# Patient Record
Sex: MALE | Race: OTHER | URBAN - METROPOLITAN AREA
[De-identification: names, ages, dates, MRNs, and addresses within clinical notes are randomized per-mention and may not be internally consistent; named-entity substitution may affect disease eponyms.]

---

## 2020-01-31 ENCOUNTER — EMERGENCY (EMERGENCY)
Facility: HOSPITAL | Age: 85
LOS: 0 days | Discharge: NURSING, REHAB OR LONG-TERM CARE | End: 2020-02-01
Attending: EMERGENCY MEDICINE | Admitting: EMERGENCY MEDICINE
Payer: MEDICARE

## 2020-01-31 VITALS
RESPIRATION RATE: 18 BRPM | HEART RATE: 80 BPM | TEMPERATURE: 99 F | SYSTOLIC BLOOD PRESSURE: 122 MMHG | OXYGEN SATURATION: 95 % | DIASTOLIC BLOOD PRESSURE: 56 MMHG

## 2020-01-31 VITALS
RESPIRATION RATE: 16 BRPM | TEMPERATURE: 99 F | HEART RATE: 74 BPM | HEIGHT: 66 IN | WEIGHT: 182.1 LBS | DIASTOLIC BLOOD PRESSURE: 65 MMHG | SYSTOLIC BLOOD PRESSURE: 148 MMHG

## 2020-01-31 LAB
ALBUMIN SERPL ELPH-MCNC: 3.2 G/DL — LOW (ref 3.5–5.2)
ALP SERPL-CCNC: 106 U/L — SIGNIFICANT CHANGE UP (ref 30–115)
ALT FLD-CCNC: 18 U/L — SIGNIFICANT CHANGE UP (ref 0–41)
ANION GAP SERPL CALC-SCNC: 11 MMOL/L — SIGNIFICANT CHANGE UP (ref 7–14)
APPEARANCE UR: CLEAR — SIGNIFICANT CHANGE UP
APTT BLD: 31.6 SEC — SIGNIFICANT CHANGE UP (ref 27–39.2)
AST SERPL-CCNC: 27 U/L — SIGNIFICANT CHANGE UP (ref 0–41)
BASOPHILS # BLD AUTO: 0.05 K/UL — SIGNIFICANT CHANGE UP (ref 0–0.2)
BASOPHILS NFR BLD AUTO: 0.5 % — SIGNIFICANT CHANGE UP (ref 0–1)
BILIRUB SERPL-MCNC: 1.1 MG/DL — SIGNIFICANT CHANGE UP (ref 0.2–1.2)
BILIRUB UR-MCNC: NEGATIVE — SIGNIFICANT CHANGE UP
BLD GP AB SCN SERPL QL: SIGNIFICANT CHANGE UP
BUN SERPL-MCNC: 18 MG/DL — SIGNIFICANT CHANGE UP (ref 10–20)
CALCIUM SERPL-MCNC: 8.3 MG/DL — LOW (ref 8.5–10.1)
CHLORIDE SERPL-SCNC: 100 MMOL/L — SIGNIFICANT CHANGE UP (ref 98–110)
CO2 SERPL-SCNC: 25 MMOL/L — SIGNIFICANT CHANGE UP (ref 17–32)
COLOR SPEC: YELLOW — SIGNIFICANT CHANGE UP
CREAT SERPL-MCNC: 0.8 MG/DL — SIGNIFICANT CHANGE UP (ref 0.7–1.5)
DIFF PNL FLD: NEGATIVE — SIGNIFICANT CHANGE UP
EOSINOPHIL # BLD AUTO: 0.15 K/UL — SIGNIFICANT CHANGE UP (ref 0–0.7)
EOSINOPHIL NFR BLD AUTO: 1.6 % — SIGNIFICANT CHANGE UP (ref 0–8)
ETHANOL SERPL-MCNC: <10 MG/DL — SIGNIFICANT CHANGE UP
GLUCOSE SERPL-MCNC: 89 MG/DL — SIGNIFICANT CHANGE UP (ref 70–99)
GLUCOSE UR QL: NEGATIVE MG/DL — SIGNIFICANT CHANGE UP
HCT VFR BLD CALC: 24.1 % — LOW (ref 42–52)
HGB BLD-MCNC: 7.5 G/DL — LOW (ref 14–18)
IMM GRANULOCYTES NFR BLD AUTO: 0.9 % — HIGH (ref 0.1–0.3)
INR BLD: 1.11 RATIO — SIGNIFICANT CHANGE UP (ref 0.65–1.3)
KETONES UR-MCNC: NEGATIVE — SIGNIFICANT CHANGE UP
LACTATE SERPL-SCNC: 1.3 MMOL/L — SIGNIFICANT CHANGE UP (ref 0.7–2)
LEUKOCYTE ESTERASE UR-ACNC: NEGATIVE — SIGNIFICANT CHANGE UP
LIDOCAIN IGE QN: 12 U/L — SIGNIFICANT CHANGE UP (ref 7–60)
LYMPHOCYTES # BLD AUTO: 1.37 K/UL — SIGNIFICANT CHANGE UP (ref 1.2–3.4)
LYMPHOCYTES # BLD AUTO: 14.2 % — LOW (ref 20.5–51.1)
MCHC RBC-ENTMCNC: 28.1 PG — SIGNIFICANT CHANGE UP (ref 27–31)
MCHC RBC-ENTMCNC: 31.1 G/DL — LOW (ref 32–37)
MCV RBC AUTO: 90.3 FL — SIGNIFICANT CHANGE UP (ref 80–94)
MONOCYTES # BLD AUTO: 0.91 K/UL — HIGH (ref 0.1–0.6)
MONOCYTES NFR BLD AUTO: 9.4 % — HIGH (ref 1.7–9.3)
NEUTROPHILS # BLD AUTO: 7.07 K/UL — HIGH (ref 1.4–6.5)
NEUTROPHILS NFR BLD AUTO: 73.4 % — SIGNIFICANT CHANGE UP (ref 42.2–75.2)
NITRITE UR-MCNC: NEGATIVE — SIGNIFICANT CHANGE UP
NRBC # BLD: 0 /100 WBCS — SIGNIFICANT CHANGE UP (ref 0–0)
PH UR: 6 — SIGNIFICANT CHANGE UP (ref 5–8)
PLATELET # BLD AUTO: 442 K/UL — HIGH (ref 130–400)
POTASSIUM SERPL-MCNC: 4.6 MMOL/L — SIGNIFICANT CHANGE UP (ref 3.5–5)
POTASSIUM SERPL-SCNC: 4.6 MMOL/L — SIGNIFICANT CHANGE UP (ref 3.5–5)
PROT SERPL-MCNC: 5.3 G/DL — LOW (ref 6–8)
PROT UR-MCNC: NEGATIVE MG/DL — SIGNIFICANT CHANGE UP
PROTHROM AB SERPL-ACNC: 12.7 SEC — SIGNIFICANT CHANGE UP (ref 9.95–12.87)
RBC # BLD: 2.67 M/UL — LOW (ref 4.7–6.1)
RBC # FLD: 14.2 % — SIGNIFICANT CHANGE UP (ref 11.5–14.5)
SODIUM SERPL-SCNC: 136 MMOL/L — SIGNIFICANT CHANGE UP (ref 135–146)
SP GR SPEC: 1.01 — SIGNIFICANT CHANGE UP (ref 1.01–1.03)
UROBILINOGEN FLD QL: 2 MG/DL (ref 0.2–0.2)
WBC # BLD: 9.64 K/UL — SIGNIFICANT CHANGE UP (ref 4.8–10.8)
WBC # FLD AUTO: 9.64 K/UL — SIGNIFICANT CHANGE UP (ref 4.8–10.8)

## 2020-01-31 PROCEDURE — 73706 CT ANGIO LWR EXTR W/O&W/DYE: CPT | Mod: 26,50

## 2020-01-31 PROCEDURE — 99284 EMERGENCY DEPT VISIT MOD MDM: CPT | Mod: GC

## 2020-01-31 PROCEDURE — 72170 X-RAY EXAM OF PELVIS: CPT | Mod: 26

## 2020-01-31 PROCEDURE — 73552 X-RAY EXAM OF FEMUR 2/>: CPT | Mod: 26,RT

## 2020-01-31 PROCEDURE — 71045 X-RAY EXAM CHEST 1 VIEW: CPT | Mod: 26

## 2020-01-31 RX ORDER — ASPIRIN/CALCIUM CARB/MAGNESIUM 324 MG
1 TABLET ORAL
Qty: 0 | Refills: 0 | DISCHARGE

## 2020-01-31 RX ORDER — ATENOLOL 25 MG/1
1 TABLET ORAL
Qty: 0 | Refills: 0 | DISCHARGE

## 2020-01-31 RX ORDER — OXYCODONE HYDROCHLORIDE 5 MG/1
0 TABLET ORAL
Qty: 0 | Refills: 0 | DISCHARGE

## 2020-01-31 RX ORDER — PIOGLITAZONE HYDROCHLORIDE 15 MG/1
1 TABLET ORAL
Qty: 0 | Refills: 0 | DISCHARGE

## 2020-01-31 RX ORDER — PREGABALIN 225 MG/1
0 CAPSULE ORAL
Qty: 0 | Refills: 0 | DISCHARGE

## 2020-01-31 RX ORDER — FERROUS SULFATE 325(65) MG
0 TABLET ORAL
Qty: 0 | Refills: 0 | DISCHARGE

## 2020-01-31 RX ORDER — ACARBOSE 25 MG/1
1 TABLET ORAL
Qty: 0 | Refills: 0 | DISCHARGE

## 2020-01-31 RX ORDER — MAGNESIUM HYDROXIDE 400 MG/1
0 TABLET, CHEWABLE ORAL
Qty: 0 | Refills: 0 | DISCHARGE

## 2020-01-31 RX ORDER — ATORVASTATIN CALCIUM 80 MG/1
1 TABLET, FILM COATED ORAL
Qty: 0 | Refills: 0 | DISCHARGE

## 2020-01-31 RX ORDER — ENOXAPARIN SODIUM 100 MG/ML
0 INJECTION SUBCUTANEOUS
Qty: 0 | Refills: 0 | DISCHARGE

## 2020-01-31 RX ORDER — FINASTERIDE 5 MG/1
1 TABLET, FILM COATED ORAL
Qty: 0 | Refills: 0 | DISCHARGE

## 2020-01-31 RX ORDER — FUROSEMIDE 40 MG
1 TABLET ORAL
Qty: 0 | Refills: 0 | DISCHARGE

## 2020-01-31 NOTE — ED PROVIDER NOTE - PATIENT PORTAL LINK FT
You can access the FollowMyHealth Patient Portal offered by [facility] by registering at the following website: [email or web address]. By joining [employer]’s FollowMyHealth portal, you will also be able to view your health information using other applications (apps) compatible with our system.

## 2020-01-31 NOTE — ED PROVIDER NOTE - NS ED ROS FT
Constitutional:  See HPI.   Eyes:  No visual changes, eye pain or discharge.  ENMT:  No hearing changes, pain, discharge or infections. No neck pain or stiffness.  Cardiac:  No chest pain, SOB or edema. No chest pain with exertion.  Respiratory:  No cough or respiratory distress. No hemoptysis.  GI:  No nausea, vomiting, diarrhea, abdominal pain. + no GI bleeding  :  No dysuria, frequency, hematuria  MS:  + right sided leg pain, + right sided brusing, No joint pain or back pain.  Neuro:  No LOC. No headache or weakness.    Skin:  No skin rash.  Except as in HPI, all other review of systems is negative

## 2020-01-31 NOTE — ED PROVIDER NOTE - ATTENDING CONTRIBUTION TO CARE
A 83 y/o m w/ pmhx of htn, hld, dm, recent R hip surgery at [facility] on 1/17 by Dr. Mehta (R hip replacement) presents from [facility] for anemia hgb of 6.8 and need for transfusion, receiving lovenox shot at NH. had staples removed today by surgical team and report wound healing well , swelling normal, normal post surgical changes. pt reports minimal pain. no current complaints. No fever, chills, n/v, cp,  pleuritic cp, sob, palpitations, diaphoresis, cough, ha/lh/dizziness, numbness/tingling, neck pain/ stiffness, abd pain, diarrhea, constipation, melena/brbpr, urinary symptoms, trauma, weakness, edema, calf pain/swelling/erythema, Ha/LH/dizziness, sick contacts, recent travel or rash.    Vital Signs: I have reviewed the initial vital signs. Constitutional: Male pt sitting on stretcher speaking full sentences. Integumentary: No rash. R hip staplex removed, healing, well c/dry/intact, ecchymoses noted to R hip and inner thigh, no pain to palpation, (+) soft comparments, mild swelling noticed compared to LLE. no erythema, streaking, warmth to palpation, induration or fluctuance, no crepitus. ENT: MMM NECK: Supple, non-tender, no meningeal signs. Cardiovascular: RRR, radial pulses 2/4 b/l. No JVD. dp and pt pulses palpable. Respiratory: BS present b/l, ctabl, no wheezing or crackles no accessory muscle use, no stridor. Gastrointestinal: BS present throughout all 4 quadrants, soft, nd, nt, no rebound tenderness or guarding, no cvat. Musculoskeletal: Decreased ROM of R hip 2/2 to surgery but report improvement since rehab.  no calf pain/erythema. Neurologic: AAOx3, motor 5/5 and sensation intact throughout upper and lower ext, CN II-XII intact, No facial droop or slurring of speech. No focal deficits.

## 2020-01-31 NOTE — ED PROVIDER NOTE - OBJECTIVE STATEMENT
84 y.o. M with PMH HTN, DM recent hip surgery done the 17th of january with low hemoglobin of 6.8 sent in from rehab facility after abnormal lab result, He just had his staples removed

## 2020-01-31 NOTE — ED PROVIDER NOTE - CARE PLAN
Assessment and plan of treatment:	Plan: EKG, CXR, pelvic xray, R femur xray, ct angelia of [facility], labs, urine, reassess. Principal Discharge DX:	Anemia  Assessment and plan of treatment:	Plan: EKG, CXR, pelvic xray, R femur xray, ct angelia of [facility], labs, urine, reassess.

## 2020-01-31 NOTE — ED PROVIDER NOTE - PROGRESS NOTE DETAILS
Pts hemoglobin 7.5, will give 1 unit of blood, leg exam not changed. all results reviewed and understood with pt and family, pt tolerating po, feeling better, blood to be given, will  continue to monitor and reassess. Will DC after blood, signed out to Dr. Be. Jaison gao spoken to, rectal exam negative for stool

## 2020-01-31 NOTE — ED PROVIDER NOTE - CLINICAL SUMMARY MEDICAL DECISION MAKING FREE TEXT BOX
pt has been resting comfortably in nad, report no pain, repeat exam with no worsening swelling or ecchymoses, family at bedside aware of all results, I unit given, pt tolerated well, pt with no weakness, sob, lh/dizziness, pt and family aware of signs and symptoms to return for, follow up with pmd and orthopedist, report will follow up.

## 2020-01-31 NOTE — ED PROVIDER NOTE - PHYSICAL EXAMINATION
Constitutional:  See HPI.   Eyes:  No visual changes, eye pain or discharge.  ENMT:  No hearing changes, pain, discharge or infections. No neck pain or stiffness.  Cardiac:  No chest pain, SOB or edema. No chest pain with exertion.  Respiratory:  No cough or respiratory distress. No hemoptysis.  GI:  No nausea, vomiting, diarrhea, abdominal pain. + no GI bleeding  :  No dysuria, frequency, hematuria  MS:  + right sided leg pain, + right sided brusing, No joint pain or back pain.  Neuro:  No LOC. No headache or weakness.    Skin:  No skin rash.  Except as in HPI, all other review of systems is negative CONSTITUTIONAL: Well-developed; well-nourished; in no acute distress.   SKIN: warm, dry  HEAD: Normocephalic; atraumatic.  EYES: PERRL, EOMI, no conjunctival erythema  ENT: No nasal discharge; airway clear.  NECK: Supple; non tender.  CARD: S1, S2 normal; no murmurs, gallops, or rubs. Regular rate and rhythm.   RESP: No wheezes, rales or rhonchi.  ABD: soft ntnd, + no blood in rectal vault  EXT: + Edematous leg, non tender, + ecchymosis, Normal ROM.  No clubbing, cyanosis or edema.   LYMPH: No acute cervical adenopathy.  NEURO: Alert, oriented, grossly unremarkable  PSYCH: Cooperative, appropriate.

## 2020-01-31 NOTE — ED ADULT NURSE NOTE - PMH
High blood pressure    High blood sugar    Hip pain, right  RT HIP ORIF 1/16/2020  LT HIP ORIF  2016

## 2020-01-31 NOTE — ED PROVIDER NOTE - NSFOLLOWUPINSTRUCTIONS_ED_ALL_ED_FT
Anemia    Anemia is a condition in which the concentration of red blood cells or hemoglobin in the blood is below normal. Hemoglobin is a substance in red blood cells that carries oxygen to the tissues of the body. Anemia results in not enough oxygen reaching these tissues which can cause symptoms such as weakness, dizziness/lightheadedness, shortness of breath, chest pain, paleness, or nausea. The cause of your anemia may or may not be determined immediately. If your hemoglobin was dangerously low, you may have received a blood transfusion. Usually reactions to transfusions occur immediately but monitor yourself for any fevers, rash, or shortness of breath.    SEEK IMMEDIATE MEDICAL CARE IF YOU HAVE ANY OF THE FOLLOWING SYMPTOMS: extreme weakness/chest pain/shortness of breath, black or bloody stools, vomiting blood, fainting, fever, or any signs of dehydration.    Blood Transfusion    WHAT YOU NEED TO KNOW:    A blood transfusion is used to give you blood through an IV. You may get only part of the blood, such as red blood cells, platelets, or plasma. The blood may be from you and stored for you to use later. The blood may instead be from another person. Donated blood is tested for HIV, hepatitis, syphilis, West Nile virus, and other diseases.    DISCHARGE INSTRUCTIONS:    Call 911 for any of the following:     You have a skin rash, hives, swelling, or itching.       You have trouble breathing, shortness of breath, wheezing, or coughing.      Your throat tightens or your lips or tongue swell.      You have difficulty swallowing or speaking.    Seek care immediately if:     You develop a high fever and chills.       You are dizzy, lightheaded, confused, or feel like you are going to faint.      You have nausea, diarrhea, or abdominal cramps, or you are vomiting.      You urinate little or not at all.      You develop headaches or double vision.      Your skin or the whites of your eyes look yellow.      You see pinpoint purple spots or purple patches on your body.       You have a seizure.     Contact your healthcare provider if:     You feel tired and weak within 10 days of your transfusion.      You have questions or concerns about blood transfusions.    Medicines:     Antihistamines may help stop mild itching or a rash.      Epinephrine is emergency medicine used to stop anaphylaxis. You may be given epinephrine if you are at risk for anaphylaxis. Your healthcare provider will teach you how to use it.      Take your medicine as directed. Contact your healthcare provider if you think your medicine is not helping or if you have side effects. Tell him or her if you are allergic to any medicine. Keep a list of the medicines, vitamins, and herbs you take. Include the amounts, and when and why you take them. Bring the list or the pill bottles to follow-up visits. Carry your medicine list with you in case of an emergency.    Apply ice to decrease pain and swelling. Use an ice pack, or put ice in a plastic bag and wrap a towel around it. Apply the ice pack or wrapped bag to your transfusion site for 20 minutes each hour or as directed.     Follow up with your healthcare provider as directed: Write down your questions so you remember to ask them during your visits.       © Copyright [employer] 2019 All illustrations and images included in CareNotes are the copyrighted property of [employer] or [employer].

## 2020-02-03 DIAGNOSIS — E11.9 TYPE 2 DIABETES MELLITUS WITHOUT COMPLICATIONS: ICD-10-CM

## 2020-02-03 DIAGNOSIS — D64.9 ANEMIA, UNSPECIFIED: ICD-10-CM

## 2020-02-03 DIAGNOSIS — I10 ESSENTIAL (PRIMARY) HYPERTENSION: ICD-10-CM

## 2020-03-11 PROBLEM — R73.9 HYPERGLYCEMIA, UNSPECIFIED: Chronic | Status: ACTIVE | Noted: 2020-01-31

## 2020-03-11 PROBLEM — M25.551 PAIN IN RIGHT HIP: Chronic | Status: ACTIVE | Noted: 2020-01-31

## 2020-03-11 PROBLEM — I10 ESSENTIAL (PRIMARY) HYPERTENSION: Chronic | Status: ACTIVE | Noted: 2020-01-31

## 2024-06-19 NOTE — ED ADULT TRIAGE NOTE - TEMPERATURE IN FAHRENHEIT (DEGREES F)
It was a pleasure to see Dilip in the office today.  For questions, concerns, or scheduling please call the office at [phone]           [phone]